# Patient Record
Sex: MALE | URBAN - METROPOLITAN AREA
[De-identification: names, ages, dates, MRNs, and addresses within clinical notes are randomized per-mention and may not be internally consistent; named-entity substitution may affect disease eponyms.]

---

## 2023-06-27 ENCOUNTER — OFFICE VISIT (OUTPATIENT)
Dept: URBAN - METROPOLITAN AREA CLINIC 44 | Facility: CLINIC | Age: 73
End: 2023-06-27
Payer: COMMERCIAL

## 2023-06-27 DIAGNOSIS — E11.9 TYPE 2 DIABETES MELLITUS W/O COMPLICATION: Primary | ICD-10-CM

## 2023-06-27 DIAGNOSIS — Z96.1 PRESENCE OF INTRAOCULAR LENS: ICD-10-CM

## 2023-06-27 PROCEDURE — 92004 COMPRE OPH EXAM NEW PT 1/>: CPT | Performed by: OPTOMETRIST

## 2023-06-27 ASSESSMENT — INTRAOCULAR PRESSURE
OS: 21
OD: 20

## 2023-06-27 ASSESSMENT — VISUAL ACUITY
OD: 20/20
OS: 20/20

## 2023-06-27 ASSESSMENT — KERATOMETRY
OD: 43.38
OS: 43.63

## 2023-06-27 NOTE — IMPRESSION/PLAN
Impression: Type 2 diabetes mellitus w/o complication: S59.9. No vascular abnormalities observed per exam. OCT clear of DME. Plan: PLAN: Discussed findings. Stressed importance of glycemic control (Target A1C <7.0) and continued care with PCP. RTC 12 months for complete exam + OCT (Mac) + OPTOS (DM Hx of more than 10 yrs), + report to PCP.